# Patient Record
Sex: MALE | Race: OTHER
[De-identification: names, ages, dates, MRNs, and addresses within clinical notes are randomized per-mention and may not be internally consistent; named-entity substitution may affect disease eponyms.]

---

## 2018-03-15 ENCOUNTER — HOSPITAL ENCOUNTER (EMERGENCY)
Dept: HOSPITAL 80 - FED | Age: 23
Discharge: HOME | End: 2018-03-15
Payer: SELF-PAY

## 2018-03-15 VITALS
OXYGEN SATURATION: 97 % | SYSTOLIC BLOOD PRESSURE: 137 MMHG | TEMPERATURE: 97.9 F | DIASTOLIC BLOOD PRESSURE: 77 MMHG | HEART RATE: 90 BPM | RESPIRATION RATE: 18 BRPM
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
## 2018-03-15 DIAGNOSIS — S06.0X9A: Primary | ICD-10-CM

## 2018-03-15 NOTE — EDPHY
H & P


Time Seen by Provider: 03/15/18 14:31


Constitutional: 


 Initial Vital Signs











Temperature (C)  36.8 C   03/15/18 14:30


 


Heart Rate  91   03/15/18 14:30


 


Respiratory Rate  16   03/15/18 14:30


 


Blood Pressure  135/89 H  03/15/18 14:30


 


O2 Sat (%)  96   03/15/18 14:30








 











O2 Delivery Mode               Room Air














Allergies/Adverse Reactions: 


 





No Known Allergies Allergy (Unverified 03/15/18 14:32)


 











Medical Decision Making





- Diagnostics


Imaging: Discussed imaging studies w/ On call Radiologist, I viewed and 

interpreted images myself


ED Course/Re-evaluation: 





neck cleared, no pain,


retrograde and anterograde amnesia, loss of consciousness, painless in rest of 

body


non-con CT





CHIEF COMPLAINT: Loss of consciousness- LTA





HISTORY OF PRESENT ILLNESS: The patient is a 23 y/o male arriving via EMS after 

a hitting a tree while skiing and losing consciousness. While skiing without a 

helmet, he began to feel he was going to fast. As an attempt to slow down, he 

dove into [place]. He remembers feeling like he was 

falling backwards. He lost consciousness and a friend at the scene reports he 

may have been gently shaking. EMS found a hematoma to the left occipital 

region. He has an associated headache. He denies any pain in his neck or other 

locations, other injuries, or any other associated symptoms. 





REVIEW OF SYSTEMS:





Constitutional: No fever, no chills or rigors, no recent illness.


Eyes: No visual changes.


ENT: No sore throat, no difficulty swallowing, no swollen glands.


Respiratory: No cough, no shortness of breath.


Cardiac: No chest pain.


Gastrointestinal: No nausea, vomiting, or diarrhea, no abdominal pain, no black 

stools


Genitourinary: No hematuria, no problems urinating.


Musculoskeletal: No calf or leg pain, no neck or back pain, no leg or ankle 

swelling.


Skin: No rashes.


Neurological: Headache, no tingling in hands or feet, no muscle spasms.


Psychiatric: No anxiety or depression.





PHYSICAL EXAM:





General Appearance: Alert, no distress, talking appropriately, comfortable.


Head: Hematoma to the left occipital region.


Eyes: Pupils equal, round, reactive to light and accommodation, EOMI, no trauma

, no injection.


Ears: Clear bilaterally, no perforation, no hemotympanum


Nose: Atraumatic, no rhinorrhea, no septal hematoma


Neck: The patient arrived in a cervical collar. All NEXUS criteria are 

negative. The cervical spine is non-tender and there is no pain or neurologic 

deficits with active range of motion. Supple, 2+ carotid upstroke bilaterally 

without bruit, no trauma, trachea midline. Cervical collar removed. 


Cardiovascular: Heart is regular rate and rhythm without murmur. Good capillary 

refill all extremities.


Chest: Atraumatic, equal bilateral breath sounds. Good oxygen saturations with 

normal minute ventilation. Chest is non-tender to palpation.


Gastrointestinal: Soft, non-tender, non-distended. No rebound, guarding, or 

peritoneal signs. There is no evidence of external or internal trauma.


Back: Spinal precautions were maintained as the patient was log-rolled with 

cervical control. There is no thoracic or lumbar spine or paraspinal 

tenderness. Spinal immobilization was removed.


Extremities: All extremities are non-tender to palpation without obvious 

deformity. There is full active range of motion of the joints.


Neurological: The patient has normal DTRs and non-focal Cranial nerves, motor, 

sensory, and cerebellar exam.


Skin: No lacerations, burns, or abrasions.





PAST MEDICAL HISTORY: Denies





PAST SURGICAL HISTORY: Denies 





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





DIFFERENTIAL DIAGNOSIS: The differential diagnosis for the patient's head 

injury included but was not limited to concussion, skull fracture, intra-

parenchymal contusion, subarachnoid, subdural and epidural hematoma.





MEDICAL DECISION MAKING: The patient presents post ski accident with a hematoma 

to the occipital region. He felt he was going to fast and dove into a snow bank 

at [facility] to slow down. He was not wearing a helmet. He lost consciousness. 

On exam he has a headache but denies any other complaints. Due to his 

anterograde and retrograde amnesia and loss of consciousness, I have opted to 

image with a non-contrast CT. 





1545: I spoke with Dr. Lucas, radiology, regarding the results of his CT. CT 

is negative for acute findings. I reassessed the patient and found his 

condition improved. I informed him of the results of his workup. I feel he is 

safe to return home with concussion information. He agrees to this course of 

action. 








Departure





- Departure


Disposition: Home, Routine, Self-Care


Clinical Impression: 


 Concussion





Condition: Good


Instructions:  Concussion (ED)


Additional Instructions: 


1. "Brain rest" - Limit screen time while symptoms are present. This includes 

phones, computers, TV, video games, etc. 


2. Physical rest while symptoms are present. Avoid any activities that could 

lead to a repeat head injury for at least two weeks or longer if symptoms 

persist. Ex. no contact sports, skiing, bicycling. 


3. Slowly advance activities as tolerated. If you begin to experience headaches

, confusion, sensitivity to light, nausea, or other worsening of symptoms you 

need to reduce your activities. Take time off from classes if you are able to.


4. Follow up with your primary care provider for symptoms that persist for more 

than 10 days. 


5. Return to the ED for severe pain, inability to walk, weakness or numbness on 

one side of your body, or other worsening of condition.


Referrals: 


Sharon Saucedo MD [Medical Doctor] - As per Instructions


Report Scribed for: Kalpesh Bernard


Report Scribed by: Ayah Ireland


Date of Report: 03/15/18


Time of Report: 15:44